# Patient Record
Sex: FEMALE | URBAN - METROPOLITAN AREA
[De-identification: names, ages, dates, MRNs, and addresses within clinical notes are randomized per-mention and may not be internally consistent; named-entity substitution may affect disease eponyms.]

---

## 2020-12-01 ENCOUNTER — OFFICE VISIT (OUTPATIENT)
Dept: URBAN - METROPOLITAN AREA CLINIC 91 | Facility: CLINIC | Age: 40
End: 2020-12-01
Payer: MEDICARE

## 2020-12-01 DIAGNOSIS — H04.123 DRY EYE SYNDROME OF BILATERAL LACRIMAL GLANDS: ICD-10-CM

## 2020-12-01 DIAGNOSIS — M32.10 SYSTEMIC LUPUS ERYTHEMATOSUS WITH ORGAN OR SYSTEM INVOLVEMENT: ICD-10-CM

## 2020-12-01 DIAGNOSIS — H52.13 MYOPIA, BILATERAL: ICD-10-CM

## 2020-12-01 DIAGNOSIS — T37.8X5S: ICD-10-CM

## 2020-12-01 PROCEDURE — 92014 COMPRE OPH EXAM EST PT 1/>: CPT | Performed by: SPECIALIST

## 2020-12-01 PROCEDURE — 92083 EXTENDED VISUAL FIELD XM: CPT | Performed by: SPECIALIST

## 2020-12-01 RX ORDER — ALUMINUM ZIRCONIUM TRICHLOROHYDREX GLY 0.19 G/G
20 MG STICK TOPICAL
Qty: 0 | Refills: 0 | Status: ACTIVE
Start: 2020-12-01

## 2020-12-01 RX ORDER — PREDNISONE 5 MG/1
5 MG TABLET ORAL
Qty: 0 | Refills: 0 | Status: ACTIVE
Start: 2020-12-01

## 2020-12-01 RX ORDER — SILDENAFIL 50 MG/1
50 MG TABLET, FILM COATED ORAL
Qty: 0 | Refills: 0 | Status: ACTIVE
Start: 2020-12-01

## 2020-12-01 RX ORDER — BACLOFEN 5 MG/1
5 MG TABLET ORAL
Qty: 0 | Refills: 0 | Status: ACTIVE
Start: 2020-12-01

## 2020-12-01 RX ORDER — FUROSEMIDE 20 MG/1
20 MG TABLET ORAL
Qty: 0 | Refills: 0 | Status: ACTIVE
Start: 2020-12-01

## 2020-12-01 RX ORDER — HYDROXYCHLOROQUINE SULFATE 200 MG/1
200 MG TABLET ORAL
Qty: 0 | Refills: 0 | Status: ACTIVE
Start: 2020-12-01

## 2020-12-01 RX ORDER — PYRIDOXINE HCL (VITAMIN B6) 50 MG
TABLET ORAL
Qty: 0 | Refills: 0 | Status: ACTIVE
Start: 2020-12-01

## 2020-12-01 ASSESSMENT — INTRAOCULAR PRESSURE
OD: 16
OS: 17

## 2020-12-01 ASSESSMENT — VISUAL ACUITY: OD: 20/20

## 2021-08-10 ENCOUNTER — OFFICE VISIT (OUTPATIENT)
Dept: URBAN - METROPOLITAN AREA CLINIC 91 | Facility: CLINIC | Age: 41
End: 2021-08-10
Payer: MEDICARE

## 2021-08-10 DIAGNOSIS — H25.13 AGE-RELATED NUCLEAR CATARACT, BILATERAL: ICD-10-CM

## 2021-08-10 DIAGNOSIS — H43.393 OTHER VITREOUS OPACITIES, BILATERAL: ICD-10-CM

## 2021-08-10 DIAGNOSIS — Z79.899 OTHER LONG TERM (CURRENT) DRUG THERAPY: Primary | ICD-10-CM

## 2021-08-10 PROCEDURE — 99214 OFFICE O/P EST MOD 30 MIN: CPT | Performed by: SPECIALIST

## 2021-08-10 ASSESSMENT — INTRAOCULAR PRESSURE
OS: 17
OD: 17

## 2021-08-10 NOTE — IMPRESSION/PLAN
Impression: Other long term (current) drug therapy: Z79.899. Plan: Patient taking Plaquenil 200 mg PO for Lupus, no maculopathy seen today OU. Patient to continue care with rheumatologist Will continue to monitor.